# Patient Record
Sex: FEMALE | HISPANIC OR LATINO
[De-identification: names, ages, dates, MRNs, and addresses within clinical notes are randomized per-mention and may not be internally consistent; named-entity substitution may affect disease eponyms.]

---

## 2017-01-23 PROBLEM — Z00.00 ENCOUNTER FOR PREVENTIVE HEALTH EXAMINATION: Status: ACTIVE | Noted: 2017-01-23

## 2017-01-27 ENCOUNTER — APPOINTMENT (OUTPATIENT)
Dept: OPHTHALMOLOGY | Facility: CLINIC | Age: 42
End: 2017-01-27

## 2017-05-26 ENCOUNTER — APPOINTMENT (OUTPATIENT)
Dept: OPHTHALMOLOGY | Facility: CLINIC | Age: 42
End: 2017-05-26

## 2017-05-26 DIAGNOSIS — Z51.81 ENCOUNTER FOR THERAPEUTIC DRUG LVL MONITORING: ICD-10-CM

## 2017-08-25 ENCOUNTER — APPOINTMENT (OUTPATIENT)
Dept: OPHTHALMOLOGY | Facility: CLINIC | Age: 42
End: 2017-08-25

## 2018-01-11 ENCOUNTER — RX RENEWAL (OUTPATIENT)
Age: 43
End: 2018-01-11

## 2018-01-11 RX ORDER — MYCOPHENOLATE MOFETIL 500 MG/1
500 TABLET ORAL TWICE DAILY
Qty: 180 | Refills: 3 | Status: ACTIVE | COMMUNITY
Start: 2017-01-27 | End: 1900-01-01

## 2018-08-24 ENCOUNTER — RX RENEWAL (OUTPATIENT)
Age: 43
End: 2018-08-24

## 2018-08-24 RX ORDER — PREDNISOLONE ACETATE 10 MG/ML
1 SUSPENSION/ DROPS OPHTHALMIC
Qty: 1 | Refills: 5 | Status: ACTIVE | COMMUNITY
Start: 2017-01-27 | End: 1900-01-01

## 2018-09-24 ENCOUNTER — APPOINTMENT (OUTPATIENT)
Dept: OPHTHALMOLOGY | Facility: CLINIC | Age: 43
End: 2018-09-24
Payer: COMMERCIAL

## 2018-09-24 DIAGNOSIS — H27.8 OTHER SPECIFIED DISORDERS OF LENS: ICD-10-CM

## 2018-09-24 DIAGNOSIS — H40.42X2 GLAUCOMA SECONDARY TO EYE INFLAMMATION, LEFT EYE, MODERATE STAGE: ICD-10-CM

## 2018-09-24 DIAGNOSIS — Z98.83 FILTERING (VITREOUS) BLEB AFTER GLAUCOMA SURGERY STATUS: ICD-10-CM

## 2018-09-24 DIAGNOSIS — H20.023 RECURRENT ACUTE IRIDOCYCLITIS, BILATERAL: ICD-10-CM

## 2018-09-24 DIAGNOSIS — Z96.1 PRESENCE OF INTRAOCULAR LENS: ICD-10-CM

## 2018-09-24 PROCEDURE — 92012 INTRM OPH EXAM EST PATIENT: CPT

## 2018-09-27 PROBLEM — Z98.83 STATUS POST GLAUCOMA SURGERY: Status: ACTIVE | Noted: 2017-01-27

## 2018-09-27 PROBLEM — H40.42X2: Status: ACTIVE | Noted: 2017-01-27

## 2018-09-27 PROBLEM — H27.8 PHACODONESIS: Status: ACTIVE | Noted: 2017-01-27

## 2018-09-27 PROBLEM — Z96.1 PSEUDOPHAKIA, BOTH EYES: Status: ACTIVE | Noted: 2017-01-27

## 2018-09-27 PROBLEM — H20.023 IRITIS, RECURRENT, BILATERAL: Status: ACTIVE | Noted: 2017-01-27

## 2018-11-26 ENCOUNTER — APPOINTMENT (OUTPATIENT)
Dept: OPHTHALMOLOGY | Facility: CLINIC | Age: 43
End: 2018-11-26

## 2019-02-15 ENCOUNTER — APPOINTMENT (OUTPATIENT)
Dept: OPHTHALMOLOGY | Facility: CLINIC | Age: 44
End: 2019-02-15

## 2019-09-20 ENCOUNTER — NON-APPOINTMENT (OUTPATIENT)
Age: 44
End: 2019-09-20

## 2019-09-20 ENCOUNTER — APPOINTMENT (OUTPATIENT)
Dept: OPHTHALMOLOGY | Facility: CLINIC | Age: 44
End: 2019-09-20
Payer: COMMERCIAL

## 2019-09-20 PROCEDURE — 92133 CPTRZD OPH DX IMG PST SGM ON: CPT

## 2019-09-20 PROCEDURE — 92014 COMPRE OPH EXAM EST PT 1/>: CPT

## 2020-01-27 ENCOUNTER — APPOINTMENT (OUTPATIENT)
Dept: OPHTHALMOLOGY | Facility: CLINIC | Age: 45
End: 2020-01-27
Payer: COMMERCIAL

## 2020-01-27 ENCOUNTER — NON-APPOINTMENT (OUTPATIENT)
Age: 45
End: 2020-01-27

## 2020-01-27 PROCEDURE — 92012 INTRM OPH EXAM EST PATIENT: CPT

## 2020-01-27 PROCEDURE — 92083 EXTENDED VISUAL FIELD XM: CPT

## 2020-09-11 ENCOUNTER — APPOINTMENT (OUTPATIENT)
Dept: OPHTHALMOLOGY | Facility: CLINIC | Age: 45
End: 2020-09-11
Payer: COMMERCIAL

## 2020-09-11 ENCOUNTER — NON-APPOINTMENT (OUTPATIENT)
Age: 45
End: 2020-09-11

## 2020-09-11 PROCEDURE — 92012 INTRM OPH EXAM EST PATIENT: CPT

## 2020-09-11 PROCEDURE — 92083 EXTENDED VISUAL FIELD XM: CPT

## 2021-01-08 ENCOUNTER — APPOINTMENT (OUTPATIENT)
Dept: OPHTHALMOLOGY | Facility: CLINIC | Age: 46
End: 2021-01-08

## 2021-05-14 ENCOUNTER — APPOINTMENT (OUTPATIENT)
Dept: OPHTHALMOLOGY | Facility: CLINIC | Age: 46
End: 2021-05-14
Payer: COMMERCIAL

## 2021-05-14 ENCOUNTER — NON-APPOINTMENT (OUTPATIENT)
Age: 46
End: 2021-05-14

## 2021-05-14 PROCEDURE — 92014 COMPRE OPH EXAM EST PT 1/>: CPT

## 2021-12-23 ENCOUNTER — NON-APPOINTMENT (OUTPATIENT)
Age: 46
End: 2021-12-23

## 2021-12-23 ENCOUNTER — APPOINTMENT (OUTPATIENT)
Dept: OPHTHALMOLOGY | Facility: CLINIC | Age: 46
End: 2021-12-23
Payer: COMMERCIAL

## 2021-12-23 PROCEDURE — 92012 INTRM OPH EXAM EST PATIENT: CPT

## 2021-12-23 PROCEDURE — 92083 EXTENDED VISUAL FIELD XM: CPT

## 2022-01-21 ENCOUNTER — APPOINTMENT (OUTPATIENT)
Dept: OPHTHALMOLOGY | Facility: CLINIC | Age: 47
End: 2022-01-21
Payer: COMMERCIAL

## 2022-01-21 ENCOUNTER — NON-APPOINTMENT (OUTPATIENT)
Age: 47
End: 2022-01-21

## 2022-01-21 PROCEDURE — 92012 INTRM OPH EXAM EST PATIENT: CPT

## 2022-01-21 PROCEDURE — 92083 EXTENDED VISUAL FIELD XM: CPT

## 2022-01-21 PROCEDURE — 92133 CPTRZD OPH DX IMG PST SGM ON: CPT

## 2022-07-08 ENCOUNTER — APPOINTMENT (OUTPATIENT)
Dept: OPHTHALMOLOGY | Facility: CLINIC | Age: 47
End: 2022-07-08

## 2022-07-08 ENCOUNTER — NON-APPOINTMENT (OUTPATIENT)
Age: 47
End: 2022-07-08

## 2022-07-08 PROCEDURE — 92012 INTRM OPH EXAM EST PATIENT: CPT

## 2022-07-08 PROCEDURE — 92134 CPTRZ OPH DX IMG PST SGM RTA: CPT

## 2022-07-22 ENCOUNTER — APPOINTMENT (OUTPATIENT)
Dept: OPHTHALMOLOGY | Facility: CLINIC | Age: 47
End: 2022-07-22

## 2023-01-12 ENCOUNTER — APPOINTMENT (OUTPATIENT)
Dept: OPHTHALMOLOGY | Facility: CLINIC | Age: 48
End: 2023-01-12
Payer: COMMERCIAL

## 2023-01-12 ENCOUNTER — NON-APPOINTMENT (OUTPATIENT)
Age: 48
End: 2023-01-12

## 2023-01-12 PROCEDURE — 92250 FUNDUS PHOTOGRAPHY W/I&R: CPT

## 2023-01-12 PROCEDURE — 92083 EXTENDED VISUAL FIELD XM: CPT

## 2023-01-12 PROCEDURE — 92014 COMPRE OPH EXAM EST PT 1/>: CPT

## 2023-07-27 ENCOUNTER — NON-APPOINTMENT (OUTPATIENT)
Age: 48
End: 2023-07-27

## 2023-08-07 ENCOUNTER — APPOINTMENT (OUTPATIENT)
Dept: OPHTHALMOLOGY | Facility: CLINIC | Age: 48
End: 2023-08-07
Payer: COMMERCIAL

## 2023-08-07 ENCOUNTER — NON-APPOINTMENT (OUTPATIENT)
Age: 48
End: 2023-08-07

## 2023-08-07 ENCOUNTER — APPOINTMENT (OUTPATIENT)
Dept: BREAST CENTER | Facility: CLINIC | Age: 48
End: 2023-08-07
Payer: COMMERCIAL

## 2023-08-07 VITALS
DIASTOLIC BLOOD PRESSURE: 93 MMHG | BODY MASS INDEX: 38.14 KG/M2 | HEART RATE: 106 BPM | HEIGHT: 61 IN | SYSTOLIC BLOOD PRESSURE: 134 MMHG | WEIGHT: 202 LBS

## 2023-08-07 DIAGNOSIS — Z80.0 FAMILY HISTORY OF MALIGNANT NEOPLASM OF DIGESTIVE ORGANS: ICD-10-CM

## 2023-08-07 DIAGNOSIS — Z78.9 OTHER SPECIFIED HEALTH STATUS: ICD-10-CM

## 2023-08-07 DIAGNOSIS — Z86.79 PERSONAL HISTORY OF OTHER DISEASES OF THE CIRCULATORY SYSTEM: ICD-10-CM

## 2023-08-07 PROCEDURE — 92133 CPTRZD OPH DX IMG PST SGM ON: CPT

## 2023-08-07 PROCEDURE — 92012 INTRM OPH EXAM EST PATIENT: CPT

## 2023-08-07 PROCEDURE — 92083 EXTENDED VISUAL FIELD XM: CPT

## 2023-08-07 PROCEDURE — 99204 OFFICE O/P NEW MOD 45 MIN: CPT

## 2023-08-07 RX ORDER — LOSARTAN POTASSIUM 50 MG/1
50 TABLET, FILM COATED ORAL
Refills: 0 | Status: ACTIVE | COMMUNITY

## 2023-08-07 NOTE — PAST MEDICAL HISTORY
[Menarche Age ____] : age at menarche was [unfilled] [Definite ___ (Date)] : the last menstrual period was [unfilled] [Regular Cycle Intervals] : have been regular [Total Preg ___] : G[unfilled] [Live Births ___] : P[unfilled]  [Age At Live Birth ___] : Age at live birth: [unfilled] [History of Hormone Replacement Treatment] : has no history of hormone replacement treatment [de-identified] : Partial hysterectomy [FreeTextEntry6] : No [FreeTextEntry7] : Yes [FreeTextEntry8] : Yes

## 2023-08-07 NOTE — HISTORY OF PRESENT ILLNESS
[FreeTextEntry1] : JUAN is a 48 year old female who presents for initial evaluation regarding abnormal breast imaging, 2 adjacent right breast nodules 9:00 4cmFN measuring 0.3cm and 0.4cm, benign appearing, were seen on screening imaging in 12/2022 recommended for 6 month follow up right breast US, which patient completed 6/2023 showing the nodules have remained stable recommended for 6 month follow up US. Pt denies palpable masses, skin lesions/changes, nipple discharge, or other breast complaints.    RAIMUNDO lifetime risk is 8.5% no family history of breast or ovarian cancer

## 2023-08-07 NOTE — DATA REVIEWED
[FreeTextEntry1] : 12/30/22 ([facility]) b/l screening mammogram/US: scattered areas of fibroglandular density, Adjacent, probably benign, circumscribed, Doppler negative, 3 and 4 mm masses are demonstrated at 9 o'clock 4 cm from the right nipple. An 8 mm cyst is present at 1 o'clock in the retroareolar left breast. No other mass is detected in either breast. There is no lymphadenopathy in the axillae. IMPRESSION: 1. 2 adjacent probably benign masses demonstrated on ultrasound in the lateral right breast. Six-month ultrasound follow-up is recommended for reassessment. 2. No suspicious imaging findings in the left breast. FOLLOW-UP: Follow-up imaging in 6 months. ASSESSMENT: BI-RADS Category 3: Probably benign    6/30/23 ([facility]) right breast US: In the right breast at 9 o'clock, there are 2 adjacent oval hypoechoic masses measuring 0.3 x 0.2 x 0.3 cm and 0.3 x 0.1 x 0.3 cm, not significantly changed. IMPRESSION: No significant change in probably benign masses in the right breast at 9 o'clock. Follow-up right targeted ultrasound in 6 months is recommended. FOLLOW-UP: Follow-up imaging in 6 months. ASSESSMENT: BI-RADS Category 3: Probably benign

## 2023-08-07 NOTE — PHYSICAL EXAM
[Normocephalic] : normocephalic [EOMI] : extra ocular movement intact [Supple] : supple [No Supraclavicular Adenopathy] : no supraclavicular adenopathy [Examined in the supine and seated position] : examined in the supine and seated position [No dominant masses] : no dominant masses in right breast  [No dominant masses] : no dominant masses left breast [No Nipple Retraction] : no left nipple retraction [No Nipple Discharge] : no left nipple discharge [No Axillary Lymphadenopathy] : no left axillary lymphadenopathy [No Rashes] : no rashes

## 2024-01-08 ENCOUNTER — APPOINTMENT (OUTPATIENT)
Dept: BREAST CENTER | Facility: CLINIC | Age: 49
End: 2024-01-08
Payer: COMMERCIAL

## 2024-01-08 VITALS
BODY MASS INDEX: 36.82 KG/M2 | HEIGHT: 61 IN | WEIGHT: 195 LBS | DIASTOLIC BLOOD PRESSURE: 88 MMHG | SYSTOLIC BLOOD PRESSURE: 136 MMHG | HEART RATE: 86 BPM

## 2024-01-08 DIAGNOSIS — R92.8 OTHER ABNORMAL AND INCONCLUSIVE FINDINGS ON DIAGNOSTIC IMAGING OF BREAST: ICD-10-CM

## 2024-01-08 DIAGNOSIS — Z12.39 ENCOUNTER FOR OTHER SCREENING FOR MALIGNANT NEOPLASM OF BREAST: ICD-10-CM

## 2024-01-08 PROCEDURE — 99213 OFFICE O/P EST LOW 20 MIN: CPT

## 2024-01-08 RX ORDER — LOSARTAN POTASSIUM 100 MG/1
TABLET, FILM COATED ORAL
Refills: 0 | Status: ACTIVE | COMMUNITY

## 2024-01-08 NOTE — DATA REVIEWED
[FreeTextEntry1] : 12/30/22 ([facility]) b/l screening mammogram/US: scattered areas of fibroglandular density, Adjacent, probably benign, circumscribed, Doppler negative, 3 and 4 mm masses are demonstrated at 9 o'clock 4 cm from the right nipple. An 8 mm cyst is present at 1 o'clock in the retroareolar left breast. No other mass is detected in either breast. There is no lymphadenopathy in the axillae. IMPRESSION: 1. 2 adjacent probably benign masses demonstrated on ultrasound in the lateral right breast. Six-month ultrasound follow-up is recommended for reassessment. 2. No suspicious imaging findings in the left breast. FOLLOW-UP: Follow-up imaging in 6 months. ASSESSMENT: BI-RADS Category 3: Probably benign  6/30/23 ([facility]) right breast US: In the right breast at 9 o'clock, there are 2 adjacent oval hypoechoic masses measuring 0.3 x 0.2 x 0.3 cm and 0.3 x 0.1 x 0.3 cm, not significantly changed. IMPRESSION: No significant change in probably benign masses in the right breast at 9 o'clock. Follow-up right targeted ultrasound in 6 months is recommended. FOLLOW-UP: Follow-up imaging in 6 months. ASSESSMENT: BI-RADS Category 3: Probably benign  1/8/2024 ([facility]) bilateral screening mammogram/US: Scattered areas of fibroglandular density right breast 9:00 4 cm FN there is a 0.5 cm cyst.  Left breast 1:00 5 cm FN there is a 0.7 cm stable ovoid solid hypoechoic mass.  No mammographic or ultrasonic evidence malignancy benign BI-RADS 2

## 2024-01-08 NOTE — HISTORY OF PRESENT ILLNESS
[FreeTextEntry1] : JUAN is a 48 year old female who presents for follow up of abnormal breast imaging, 2 adjacent right breast nodules 9:00 4cmFN measuring 0.3cm and 0.4cm, benign appearing, were seen on screening imaging in 12/2022 recommended for 6 month follow up right breast US, which patient completed 6/2023 showing the nodules have remained stable recommended for 6 month follow up US.  Patient underwent a bilateral screening mammogram/US today with benign BI-RADS 2 results.  Pt denies palpable masses, skin lesions/changes, nipple discharge, or other breast complaints.    RAIMUNDO lifetime risk is 8.5% no family history of breast or ovarian cancer

## 2024-01-08 NOTE — PAST MEDICAL HISTORY
[Menarche Age ____] : age at menarche was [unfilled] [Definite ___ (Date)] : the last menstrual period was [unfilled] [Regular Cycle Intervals] : have been regular [Total Preg ___] : G[unfilled] [Live Births ___] : P[unfilled]  [Age At Live Birth ___] : Age at live birth: [unfilled] [History of Hormone Replacement Treatment] : has no history of hormone replacement treatment [de-identified] : Partial hysterectomy [FreeTextEntry6] : No [FreeTextEntry7] : Yes [FreeTextEntry8] : Yes

## 2024-01-08 NOTE — ASSESSMENT
[FreeTextEntry1] : 48-year-old female with abnormal breast imaging now resolved and found to be benign
None

## 2024-03-05 ENCOUNTER — NON-APPOINTMENT (OUTPATIENT)
Age: 49
End: 2024-03-05

## 2024-03-05 ENCOUNTER — APPOINTMENT (OUTPATIENT)
Dept: OPHTHALMOLOGY | Facility: CLINIC | Age: 49
End: 2024-03-05
Payer: COMMERCIAL

## 2024-03-05 ENCOUNTER — APPOINTMENT (OUTPATIENT)
Dept: ORTHOPEDIC SURGERY | Facility: CLINIC | Age: 49
End: 2024-03-05
Payer: COMMERCIAL

## 2024-03-05 ENCOUNTER — TRANSCRIPTION ENCOUNTER (OUTPATIENT)
Age: 49
End: 2024-03-05

## 2024-03-05 DIAGNOSIS — M54.50 LOW BACK PAIN, UNSPECIFIED: ICD-10-CM

## 2024-03-05 PROCEDURE — 72110 X-RAY EXAM L-2 SPINE 4/>VWS: CPT

## 2024-03-05 PROCEDURE — 92014 COMPRE OPH EXAM EST PT 1/>: CPT

## 2024-03-05 PROCEDURE — 92250 FUNDUS PHOTOGRAPHY W/I&R: CPT

## 2024-03-05 PROCEDURE — 92083 EXTENDED VISUAL FIELD XM: CPT

## 2024-03-05 PROCEDURE — 99204 OFFICE O/P NEW MOD 45 MIN: CPT

## 2024-03-05 RX ORDER — DICLOFENAC SODIUM 75 MG/1
75 TABLET, DELAYED RELEASE ORAL
Qty: 60 | Refills: 1 | Status: ACTIVE | COMMUNITY
Start: 2024-03-05 | End: 1900-01-01

## 2024-03-05 NOTE — ASSESSMENT
[FreeTextEntry1] : 49 yo female presenting with lumbar back pain x 6months. Pt states pain is worse with sitting and she feels it improves when she places pillows behind her back or walks. Pt feels "discomfort" in her left leg intermittently and only during the night. She is using advil, lidocaine patches, and icy hot with some relief. She has never been evaluated before for her symptoms. denies recent illness, fevers, numbness, weakness, balance problems, saddle anesthesia, urinary retention or fecal incontinence. The patient was given a referral for physical therapy. Start diclofenac in place of advil. Start gabapentin at nighttime. We discussed red flag symptoms that would require emergent evaluation. She knows to call with any questions or concerns or if her symptoms acutely worsen.

## 2024-03-05 NOTE — PHYSICAL EXAM
[UE/LE] : Sensory: Intact in bilateral upper & lower extremities [ALL] : dorsalis pedis, posterior tibial, femoral, popliteal, and radial 2+ and symmetric bilaterally [Poor Appearance] : well-appearing [Acute Distress] : not in acute distress [Normal] : Oriented to person, place, and time, insight and judgement were intact and the affect was normal [de-identified] : General: No acute distress, conversant, well-nourished. Head: Normocephalic, atraumatic Neck: trachea midline, FROM Heart: normotensive and normal rate and rhythm Lungs: No labored breathing Skin: No abrasions, no rashes, no edema Psych: Alert and oriented to person, place and time Extremities: no peripheral edema or digital cyanosis Gait: Normal gait. Can perform tandem gait.   Vascular: warm and well perfused distally, palpable distal pulses  MSK: Lumbar spine: No tenderness to palpation.  No step-off, no deformity.  NEURO EXAM: Sensation  Left L2  -  2/2             Left L3  -  2/2 Left L4  -  2/2 Left L5  -  2/2 Left S1  -  2/2  Right L2  -  2/2             Right L3  -  2/2 Right L4  -  2/2 Right L5  -  2/2 Right S1  -  2/2  Motor:  Left L2 (hip flexion)                            5/5                 Left L3 (knee extension)                   5/5                 Left L4 (ankle dorsiflexion)                 5/5                 Left L5 (long toe extensor)                5/5                 Left S1 (ankle plantar flexion)           5/5  Right L2 (hip flexion)                            5/5                 Right L3 (knee extension)                   5/5                 Right L4 (ankle dorsiflexion)                 5/5                 Right L5 (long toe extensor)                5/5                 Right S1 (ankle plantar flexion)           5/5  Reflexes: Normal and symmetric Negative clonus.  Down-going Babinski.    [de-identified] : Lumbar 4 view radiographs taken in the office today show no dislocation or fracture.  Lumbar spondylosis.  No instability on dynamic series. Mild degenerative changes.

## 2024-03-05 NOTE — HISTORY OF PRESENT ILLNESS
[de-identified] : 49 yo female presenting with lumbar back pain x 6months. Pt states pain is worse with sitting and she feels it improves when she places pillows behind her back or walks. Pt feels "discomfort" in her left leg intermittently and only during the night. She is using advil, lidocaine patches, and icy hot with some relief. She has never been evaluated before for her symptoms. denies recent illness, fevers, numbness, weakness, balance problems, saddle anesthesia, urinary retention or fecal incontinence.

## 2024-05-03 ENCOUNTER — APPOINTMENT (OUTPATIENT)
Dept: ORTHOPEDIC SURGERY | Facility: CLINIC | Age: 49
End: 2024-05-03

## 2024-05-10 ENCOUNTER — RX RENEWAL (OUTPATIENT)
Age: 49
End: 2024-05-10

## 2024-05-10 RX ORDER — GABAPENTIN 300 MG/1
300 CAPSULE ORAL
Qty: 30 | Refills: 0 | Status: ACTIVE | COMMUNITY
Start: 2024-03-05 | End: 1900-01-01

## 2024-09-09 ENCOUNTER — APPOINTMENT (OUTPATIENT)
Dept: OPHTHALMOLOGY | Facility: CLINIC | Age: 49
End: 2024-09-09

## 2024-10-16 ENCOUNTER — RX RENEWAL (OUTPATIENT)
Age: 49
End: 2024-10-16